# Patient Record
Sex: MALE | Race: WHITE | NOT HISPANIC OR LATINO | Employment: FULL TIME | URBAN - METROPOLITAN AREA
[De-identification: names, ages, dates, MRNs, and addresses within clinical notes are randomized per-mention and may not be internally consistent; named-entity substitution may affect disease eponyms.]

---

## 2020-09-17 ENCOUNTER — OFFICE VISIT (OUTPATIENT)
Dept: URGENT CARE | Facility: CLINIC | Age: 25
End: 2020-09-17
Payer: OTHER MISCELLANEOUS

## 2020-09-17 VITALS
WEIGHT: 195 LBS | TEMPERATURE: 98 F | RESPIRATION RATE: 16 BRPM | OXYGEN SATURATION: 98 % | DIASTOLIC BLOOD PRESSURE: 77 MMHG | BODY MASS INDEX: 26.41 KG/M2 | SYSTOLIC BLOOD PRESSURE: 134 MMHG | HEART RATE: 72 BPM | HEIGHT: 72 IN

## 2020-09-17 DIAGNOSIS — M54.9 DORSALGIA, UNSPECIFIED: ICD-10-CM

## 2020-09-17 DIAGNOSIS — M54.42 ACUTE LEFT-SIDED LOW BACK PAIN WITH LEFT-SIDED SCIATICA: Primary | ICD-10-CM

## 2020-09-17 DIAGNOSIS — M43.17 SPONDYLOLISTHESIS AT L5-S1 LEVEL: ICD-10-CM

## 2020-09-17 PROCEDURE — 99203 OFFICE O/P NEW LOW 30 MIN: CPT | Mod: S$GLB,,, | Performed by: PHYSICIAN ASSISTANT

## 2020-09-17 PROCEDURE — 72100 XR LUMBAR SPINE 2 OR 3 VIEWS: ICD-10-PCS | Mod: FY,S$GLB,, | Performed by: RADIOLOGY

## 2020-09-17 PROCEDURE — 72100 X-RAY EXAM L-S SPINE 2/3 VWS: CPT | Mod: FY,S$GLB,, | Performed by: RADIOLOGY

## 2020-09-17 PROCEDURE — 99203 PR OFFICE/OUTPT VISIT, NEW, LEVL III, 30-44 MIN: ICD-10-PCS | Mod: S$GLB,,, | Performed by: PHYSICIAN ASSISTANT

## 2020-09-17 RX ORDER — METHYLPREDNISOLONE 4 MG/1
TABLET ORAL
Qty: 1 PACKAGE | Refills: 0 | Status: SHIPPED | OUTPATIENT
Start: 2020-09-17 | End: 2020-09-24

## 2020-09-17 RX ORDER — NAPROXEN 500 MG/1
500 TABLET ORAL 2 TIMES DAILY WITH MEALS
Qty: 30 TABLET | Refills: 0 | Status: SHIPPED | OUTPATIENT
Start: 2020-09-17 | End: 2020-10-28 | Stop reason: ALTCHOICE

## 2020-09-17 NOTE — PROGRESS NOTES
Subjective:       Patient ID: Aston Aviles is a 24 y.o. male.    Chief Complaint: Back Pain    New WC Back Pain ( DOI 09- ) While working at a job site, he picked up a bin of crowbars and immediately felt pain, which progressively got worse during the day. Pain score 2/10 with complaints of Constant Aching pain that radiates down his LT Leg to his LT Knee, Stiffness, Pain w/walking. Taking OTC IBP for pain. SH    Back Pain  This is a new problem. The current episode started 1 to 4 weeks ago. The problem occurs constantly. The problem has been gradually improving since onset. The pain is present in the lumbar spine. The quality of the pain is described as aching. The pain radiates to the left knee and left thigh. The pain is at a severity of 2/10. The pain is moderate. The pain is the same all the time. The symptoms are aggravated by bending, lying down, sitting, standing and twisting. Stiffness is present all day. Pertinent negatives include no abdominal pain, bladder incontinence, bowel incontinence, chest pain, dysuria, fever, numbness or tingling. The treatment provided mild relief.       Constitution: Negative for chills, fatigue and fever.   HENT: Negative for facial trauma.    Neck: Negative for neck pain and neck stiffness.   Cardiovascular: Negative for chest trauma and chest pain.   Eyes: Negative for eye trauma and eye pain.   Respiratory: Negative for shortness of breath.    Gastrointestinal: Negative for abdominal trauma, abdominal pain and bowel incontinence.   Genitourinary: Negative for dysuria, urgency, bladder incontinence, hematuria and history of kidney stones.   Musculoskeletal: Positive for pain, back pain and muscle ache. Negative for joint pain, abnormal ROM of joint, muscle cramps and history of spine disorder.   Skin: Negative for rash and wound.   Neurological: Negative for coordination disturbances, numbness and tingling.        Objective:      Physical Exam  Vitals signs and  nursing note reviewed.   Constitutional:       General: He is not in acute distress.     Appearance: Normal appearance. He is well-developed.   HENT:      Head: Normocephalic and atraumatic.      Right Ear: Hearing and external ear normal.      Left Ear: Hearing and external ear normal.      Nose: Nose normal. No nasal deformity.   Eyes:      General: Lids are normal.      Conjunctiva/sclera: Conjunctivae normal.      Right eye: Right conjunctiva is not injected.      Left eye: Left conjunctiva is not injected.   Neck:      Musculoskeletal: Normal range of motion. No spinous process tenderness or muscular tenderness.      Trachea: Trachea normal.   Cardiovascular:      Pulses: Normal pulses.           Dorsalis pedis pulses are 2+ on the right side and 2+ on the left side.        Posterior tibial pulses are 2+ on the right side and 2+ on the left side.   Pulmonary:      Effort: Pulmonary effort is normal. No respiratory distress.      Breath sounds: No stridor.   Musculoskeletal:      Cervical back: Normal.      Thoracic back: Normal.      Lumbar back: He exhibits tenderness. He exhibits normal range of motion, no deformity and normal pulse.        Back:    Skin:     General: Skin is warm and dry.      Findings: No abrasion or bruising.   Neurological:      Mental Status: He is alert.      GCS: GCS eye subscore is 4. GCS verbal subscore is 5. GCS motor subscore is 6.      Sensory: No sensory deficit.      Deep Tendon Reflexes: Reflexes are normal and symmetric.      Reflex Scores:       Patellar reflexes are 2+ on the right side and 2+ on the left side.       Achilles reflexes are 2+ on the right side and 2+ on the left side.     Comments: SLR positive LLE at 40 degrees  Negative RLE    Psychiatric:         Attention and Perception: He is attentive.         Speech: Speech normal.         Behavior: Behavior normal.         Thought Content: Thought content normal.           X-ray Lumbar Spine 2 Or 3 Views    Result  Date: 9/17/2020  EXAMINATION: XR LUMBAR SPINE 2 OR 3 VIEWS CLINICAL HISTORY: Back pain or radiculopathy, < 6 wks, uncomplicated;WORKERS COMP;  Lumbago with sciatica, left side TECHNIQUE: PA, lateral and lateral lumbosacral view COMPARISON: None FINDINGS: There is 5 mm of anterolisthesis of L5 on S1 with similar 5 mm of retrolisthesis of L4 and L5.  There is no fracture or pedicle destruction.  The SI joints and lumbosacral junction appear maintained.     Spondylosis at L4-5 and L5-S1 with 5 mm of retrolisthesis of L4 on L5 and 5 mm of anterolisthesis of L5 on S1. No evidence of fracture, pedicle destruction or bony destructive process. Electronically signed by: Khanh Maxwell Date:    09/17/2020 Time:    17:13    Assessment:       1. Acute left-sided low back pain with left-sided sciatica    2. Dorsalgia, unspecified    3. Spondylolisthesis at L5-S1 level        Plan:         Medications Ordered This Encounter   Medications    methylPREDNISolone (MEDROL DOSEPACK) 4 mg tablet     Sig: use as directed     Dispense:  1 Package     Refill:  0    naproxen (NAPROSYN) 500 MG tablet     Sig: Take 1 tablet (500 mg total) by mouth 2 (two) times daily with meals.     Dispense:  30 tablet     Refill:  0     Patient Instructions: Daily home exercises/warm soaks   Restrictions: No lifting/pushing/pulling more than 25 lbs  Follow up in about 1 week (around 9/24/2020).        Patient Instructions       Sciatica    Sciatica is a condition that causes pain in the lower back that spreads down into the buttock, hip, and leg. Sometimes the leg pain can happen without any back pain. Sciatica happens when a spinal nerve is irritated or has pressure put on it as comes out of the spinal canal in the lower back. This most often happens when a bulge or rupture of a nearby spinal disk presses on the nerve. Sciatica can also be caused by a narrowing of the spinal canal (spinal stenosis) or spasm of the muscle in the buttocks that the sciatic  nerve passes through (pyriform muscle). Sciatica is also called lumbar radiculopathy.  Sciatica may begin after a sudden twisting or bending force, such as in a car accident. Or it can happen after a simple awkward movement. In either case, muscle spasm often also happens. Muscle spasm makes the pain worse.  A healthcare provider makes a diagnosis of sciatica from your symptoms and a physical exam. Unless you had an injury from a car accident or fall, you usually wont have X-rays taken at this time. This is because the nerves and disks in your back cant be seen on an X-ray. If the provider sees signs of a compressed nerve, you will need to schedule an MRI scan as an outpatient. Signs of a compressed nerve include loss of strength in a leg.  Most sciatica gets better with medicine, exercise, and physical therapy. If your symptoms continue after at least 3 months of medical treatment, you may need surgery or injections to your lower back.  Home care  Follow these tips when caring for yourself at home:  · You may need to stay in bed the first few days. But as soon as possible, begin sitting up or walking. This will help you avoid problems that come from staying in bed for long periods.  · When in bed, try to find a position that is comfortable. A firm mattress is best. Try lying flat on your back with pillows under your knees. You can also try lying on your side with your knees bent up toward your chest and a pillow between your knees.  · Avoid sitting for long periods. This puts more stress on your lower back than standing or walking.  · Use heat from a hot shower, hot bath, or heating pad to help ease pain. Massage can also help. You can also try using an ice pack. You can make your own ice pack by putting ice cubes in a plastic bag. Wrap the bag in a thin towel. Try both heat and cold to see which works best. Use the method that feels best for 20 minutes several times a day.  · You may use acetaminophen or  ibuprofen to ease pain, unless another pain medicine was prescribed. Note: If you have chronic liver or kidney disease, talk with your healthcare provider before taking these medicines. Also talk with your provider if youve had a stomach ulcer or gastrointestinal bleeding.  · Use safe lifting methods. Dont lift anything heavier than 15 pounds until all of the pain is gone.  Follow-up care  Follow up with your healthcare provider, or as advised. You may need physical therapy or additional tests.  If X-rays were taken, a radiologist will look at them. You will be told of any new findings that may affect your care.  When to seek medical advice  Call your healthcare provider right away if any of these occur:  · Pain gets worse even after taking prescribed medicine  · Weakness or numbness in 1 or both legs or hips  · Numbness in your groin or genital area  · You cant control your bowel or bladder  · Fever  · Redness or swelling over your back or spine   Date Last Reviewed: 8/1/2016  © 5421-2508 [employer]. [address] [place], [place] [address]. All rights reserved. This information is not intended as a substitute for professional medical care. Always follow your healthcare professional's instructions.        Back Exercises: Abdominal Lift Brace with Marching    The abdominal lift brace with march strengthens your lower abdominal muscles, helping you keep your pelvis and back stable:  · Lie on the floor with both knees bent. Put your feet flat on the floor and your arms by your sides. Tighten your abdominal muscles. Be sure to continue to breathe.  · Lift one bent knee about 2 inches then return it to the floor and lift the other about 2 inches. Keep your abdominal muscles tight and continue to breathe. These motions should be slow and controlled without your pelvis rocking side to side.  · Repeat 10 times.  Date Last Reviewed: 8/16/2015  © 4737-7793 [employer]. [address] [place] [address],  [place] [address]. All rights reserved. This information is not intended as a substitute for professional medical care. Always follow your healthcare professional's instructions.        Back Exercises: Back Press    Do this exercise on your hands and knees. Keep your knees under your hips and your hands under your shoulders. Keep your spine in a neutral position (not arched or sagging). Be sure to maintain your necks natural curve:  · Tighten your stomach and buttock muscles to press your back upward. Let your head drop slightly.  · Hold for 5 seconds. Return to starting position.  · Repeat 5 times.  Date Last Reviewed: 10/11/2015  © 6887-5559 [employer]. [address] [place] [address]. All rights reserved. This information is not intended as a substitute for professional medical care. Always follow your healthcare professional's instructions.        Back Exercises: Leg Pull    To start, lie on your back with your knees bent and feet flat on the floor. Dont press your neck or lower back to the floor. Breathe deeply. You should feel comfortable and relaxed in this position.  · Pull one knee to your chest.  · Hold for 30 to 60 seconds. Return to starting position.  · Repeat 2 times.  · Switch legs.  · For a double leg pull, pull both legs to your chest at the same time. Repeat 2 times.  For your safety, check with your healthcare provider before starting an exercise program.   Date Last Reviewed: 8/16/2015  © 5360-0789 [employer]. [address] [place] [address]. All rights reserved. This information is not intended as a substitute for professional medical care. Always follow your healthcare professional's instructions.

## 2020-09-17 NOTE — LETTER
[facility]  [address], [address]  [place] [address]  Phone: [phone]  Fax: [phone]  [facility]: [email or web address]    Pt Name: Aston Saavedra Date: 09/01/2020   Employee ID: [id number] Date of First Treatment: 09/17/2020   Company: [employer]      Appointment Time:  Arrived: 3:32 PM   Provider: Louis Mar PA-C Time Out: 5:30 PM     Office Treatment:   1. Acute left-sided low back pain with left-sided sciatica    2. Dorsalgia, unspecified    3. Spondylolisthesis at L5-S1 level      Medications Ordered This Encounter   Medications    methylPREDNISolone (MEDROL DOSEPACK) 4 mg tablet    naproxen (NAPROSYN) 500 MG tablet      Patient Instructions: Daily home exercises/warm soaks    Restrictions: No lifting/pushing/pulling more than 25 lbs     Return Appointment:   9/24/2020 at 10:30 AM       SH

## 2020-09-17 NOTE — PATIENT INSTRUCTIONS
Sciatica    Sciatica is a condition that causes pain in the lower back that spreads down into the buttock, hip, and leg. Sometimes the leg pain can happen without any back pain. Sciatica happens when a spinal nerve is irritated or has pressure put on it as comes out of the spinal canal in the lower back. This most often happens when a bulge or rupture of a nearby spinal disk presses on the nerve. Sciatica can also be caused by a narrowing of the spinal canal (spinal stenosis) or spasm of the muscle in the buttocks that the sciatic nerve passes through (pyriform muscle). Sciatica is also called lumbar radiculopathy.  Sciatica may begin after a sudden twisting or bending force, such as in a car accident. Or it can happen after a simple awkward movement. In either case, muscle spasm often also happens. Muscle spasm makes the pain worse.  A healthcare provider makes a diagnosis of sciatica from your symptoms and a physical exam. Unless you had an injury from a car accident or fall, you usually wont have X-rays taken at this time. This is because the nerves and disks in your back cant be seen on an X-ray. If the provider sees signs of a compressed nerve, you will need to schedule an MRI scan as an outpatient. Signs of a compressed nerve include loss of strength in a leg.  Most sciatica gets better with medicine, exercise, and physical therapy. If your symptoms continue after at least 3 months of medical treatment, you may need surgery or injections to your lower back.  Home care  Follow these tips when caring for yourself at home:  · You may need to stay in bed the first few days. But as soon as possible, begin sitting up or walking. This will help you avoid problems that come from staying in bed for long periods.  · When in bed, try to find a position that is comfortable. A firm mattress is best. Try lying flat on your back with pillows under your knees. You can also try lying on your side with your knees bent up  toward your chest and a pillow between your knees.  · Avoid sitting for long periods. This puts more stress on your lower back than standing or walking.  · Use heat from a hot shower, hot bath, or heating pad to help ease pain. Massage can also help. You can also try using an ice pack. You can make your own ice pack by putting ice cubes in a plastic bag. Wrap the bag in a thin towel. Try both heat and cold to see which works best. Use the method that feels best for 20 minutes several times a day.  · You may use acetaminophen or ibuprofen to ease pain, unless another pain medicine was prescribed. Note: If you have chronic liver or kidney disease, talk with your healthcare provider before taking these medicines. Also talk with your provider if youve had a stomach ulcer or gastrointestinal bleeding.  · Use safe lifting methods. Dont lift anything heavier than 15 pounds until all of the pain is gone.  Follow-up care  Follow up with your healthcare provider, or as advised. You may need physical therapy or additional tests.  If X-rays were taken, a radiologist will look at them. You will be told of any new findings that may affect your care.  When to seek medical advice  Call your healthcare provider right away if any of these occur:  · Pain gets worse even after taking prescribed medicine  · Weakness or numbness in 1 or both legs or hips  · Numbness in your groin or genital area  · You cant control your bowel or bladder  · Fever  · Redness or swelling over your back or spine   Date Last Reviewed: 8/1/2016  © 2715-9640 [employer]. [address] [place] [address], [place] [address]. All rights reserved. This information is not intended as a substitute for professional medical care. Always follow your healthcare professional's instructions.        Back Exercises: Abdominal Lift Brace with Marching    The abdominal lift brace with march strengthens your lower abdominal muscles, helping you keep your pelvis and back  stable:  · Lie on the floor with both knees bent. Put your feet flat on the floor and your arms by your sides. Tighten your abdominal muscles. Be sure to continue to breathe.  · Lift one bent knee about 2 inches then return it to the floor and lift the other about 2 inches. Keep your abdominal muscles tight and continue to breathe. These motions should be slow and controlled without your pelvis rocking side to side.  · Repeat 10 times.  Date Last Reviewed: 8/16/2015  © 6532-6125 [employer]. [address] [place] [address]. All rights reserved. This information is not intended as a substitute for professional medical care. Always follow your healthcare professional's instructions.        Back Exercises: Back Press    Do this exercise on your hands and knees. Keep your knees under your hips and your hands under your shoulders. Keep your spine in a neutral position (not arched or sagging). Be sure to maintain your necks natural curve:  · Tighten your stomach and buttock muscles to press your back upward. Let your head drop slightly.  · Hold for 5 seconds. Return to starting position.  · Repeat 5 times.  Date Last Reviewed: 10/11/2015  © 7003-6923 [employer]. [address] [place] [address]. All rights reserved. This information is not intended as a substitute for professional medical care. Always follow your healthcare professional's instructions.        Back Exercises: Leg Pull    To start, lie on your back with your knees bent and feet flat on the floor. Dont press your neck or lower back to the floor. Breathe deeply. You should feel comfortable and relaxed in this position.  · Pull one knee to your chest.  · Hold for 30 to 60 seconds. Return to starting position.  · Repeat 2 times.  · Switch legs.  · For a double leg pull, pull both legs to your chest at the same time. Repeat 2 times.  For your safety, check with your healthcare provider before starting an exercise  program.   Date Last Reviewed: 8/16/2015  © 8726-5837 [employer]. [address] [place] [address], [place] [address]. All rights reserved. This information is not intended as a substitute for professional medical care. Always follow your healthcare professional's instructions.

## 2020-09-24 ENCOUNTER — OFFICE VISIT (OUTPATIENT)
Dept: URGENT CARE | Facility: CLINIC | Age: 25
End: 2020-09-24
Payer: OTHER MISCELLANEOUS

## 2020-09-24 DIAGNOSIS — M54.42 ACUTE LEFT-SIDED LOW BACK PAIN WITH LEFT-SIDED SCIATICA: Primary | ICD-10-CM

## 2020-09-24 DIAGNOSIS — M43.17 SPONDYLOLISTHESIS AT L5-S1 LEVEL: ICD-10-CM
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
## 2020-09-24 PROCEDURE — 99214 PR OFFICE/OUTPT VISIT, EST, LEVL IV, 30-39 MIN: ICD-10-PCS | Mod: S$GLB,,, | Performed by: PHYSICIAN ASSISTANT

## 2020-09-24 PROCEDURE — 99214 OFFICE O/P EST MOD 30 MIN: CPT | Mod: S$GLB,,, | Performed by: PHYSICIAN ASSISTANT

## 2020-09-24 NOTE — LETTER
[facility]  [address], [address]  [place] [facility] [address]  Phone: [phone]  Fax: [phone]  [facility]: [email or web address]    Pt Name: Aston Saavedra Date: 09/01/2020   Employee ID: [id number] Date of  Treatment: 09/24/2020   Company: [employer]      Appointment Time: 03:15 PM Arrived: 3:28 PM   Provider: Louis Mar PA-C Time Out: 4:20 PM     Office Treatment:     1. Acute left-sided low back pain with left-sided sciatica    2. Spondylolisthesis at L5-S1 level    3. Work related injury          Patient Instructions: Daily home exercises/warm soaks(Continue Naproxen 500mg twice daily.)    Restrictions: No lifting/pushing/pulling more than 25 lbs     Return Appointment: 09/30/2020 @ 2:00 PM  IJ

## 2020-09-24 NOTE — PROGRESS NOTES
Subjective:       Patient ID: Aston Aviles is a 24 y.o. male.    Chief Complaint: Back Pain    Patient presents in clinic for W/C follow up on Back Pain (DOI 09- ).  He states his pain has improved, and rates the pain at a level 1.  Takes Naproxen PRN.  Finished the steroids.    Back Pain  This is a recurrent problem. The current episode started 1 to 4 weeks ago. The problem occurs intermittently. The problem has been rapidly improving since onset. The pain is present in the lumbar spine. The pain does not radiate. The pain is at a severity of 1/10. The pain is mild. The symptoms are aggravated by bending. Associated symptoms include tingling. Pertinent negatives include no abdominal pain, bladder incontinence, bowel incontinence, chest pain, dysuria, fever, headaches, leg pain, numbness, paresis, paresthesias, pelvic pain, perianal numbness, weakness or weight loss. Risk factors include recent trauma. He has tried NSAIDs for the symptoms. The treatment provided moderate relief.       Constitution: Negative for fatigue and fever.   Cardiovascular: Negative for chest pain.   Gastrointestinal: Negative for abdominal pain and bowel incontinence.   Genitourinary: Negative for dysuria, urgency, bladder incontinence, hematuria and pelvic pain.   Musculoskeletal: Positive for back pain. Negative for muscle cramps and history of spine disorder.   Skin: Negative for rash.   Neurological: Negative for coordination disturbances, headaches, numbness and tingling.        Objective:      Physical Exam  Nursing note reviewed.   Constitutional:       General: He is not in acute distress.     Appearance: Normal appearance. He is well-developed.   HENT:      Head: Normocephalic and atraumatic.      Right Ear: Hearing and external ear normal.      Left Ear: Hearing and external ear normal.      Nose: Nose normal. No nasal deformity.   Eyes:      General: Lids are normal.      Conjunctiva/sclera: Conjunctivae normal.      Right  eye: Right conjunctiva is not injected.      Left eye: Left conjunctiva is not injected.   Neck:      Musculoskeletal: Normal range of motion. No spinous process tenderness or muscular tenderness.      Trachea: Trachea normal.   Cardiovascular:      Pulses: Normal pulses.           Dorsalis pedis pulses are 2+ on the right side and 2+ on the left side.        Posterior tibial pulses are 2+ on the right side and 2+ on the left side.   Pulmonary:      Effort: Pulmonary effort is normal. No respiratory distress.      Breath sounds: No stridor.   Musculoskeletal:      Cervical back: Normal.      Thoracic back: Normal.      Lumbar back: He exhibits normal range of motion, no tenderness, no deformity and normal pulse.   Skin:     General: Skin is warm and dry.      Findings: No abrasion or bruising.   Neurological:      Mental Status: He is alert.      GCS: GCS eye subscore is 4. GCS verbal subscore is 5. GCS motor subscore is 6.      Sensory: No sensory deficit.      Deep Tendon Reflexes:      Reflex Scores:       Patellar reflexes are 1+ on the right side and 1+ on the left side.       Achilles reflexes are 2+ on the right side and 2+ on the left side.     Comments: SLR negative bilaterally   Psychiatric:         Attention and Perception: He is attentive.         Speech: Speech normal.         Behavior: Behavior normal.         Thought Content: Thought content normal.         Assessment:       1. Acute left-sided low back pain with left-sided sciatica    2. Spondylolisthesis at L5-S1 level    3. Work related injury        Plan:       Patient is doing much better with minimal pain however L-spine x-ray is very abnormal.  I will have patient see Dr. Pena for evaluation and work status.         Patient Instructions: Daily home exercises/warm soaks(Continue Naproxen 500mg twice daily.)   Restrictions: No lifting/pushing/pulling more than 25 lbs  Follow up in about 6 days (around 9/30/2020) for Dr. Pena.

## 2020-09-30 ENCOUNTER — OFFICE VISIT (OUTPATIENT)
Dept: URGENT CARE | Facility: CLINIC | Age: 25
End: 2020-09-30
Payer: OTHER MISCELLANEOUS

## 2020-09-30 DIAGNOSIS — M54.42 ACUTE LEFT-SIDED LOW BACK PAIN WITH LEFT-SIDED SCIATICA: Primary | ICD-10-CM

## 2020-09-30 DIAGNOSIS — M43.17 SPONDYLOLISTHESIS AT L5-S1 LEVEL: ICD-10-CM

## 2020-09-30 PROCEDURE — 99214 PR OFFICE/OUTPT VISIT, EST, LEVL IV, 30-39 MIN: ICD-10-PCS | Mod: S$GLB,,, | Performed by: PREVENTIVE MEDICINE

## 2020-09-30 PROCEDURE — 99214 OFFICE O/P EST MOD 30 MIN: CPT | Mod: S$GLB,,, | Performed by: PREVENTIVE MEDICINE

## 2020-09-30 NOTE — LETTER
[facility]  [address], [address]  [place] [facility] [place] [address]  Phone: [phone]  Fax: [phone]  [facility]: [id number]    Pt Name: Aston Saavedra Date: 09/01/2020   Employee ID: [id number] Date of Treatment: 09/30/2020   Company: [employer]      Appointment Time: 02:00 PM Arrived: 1:35 PM   Provider: Remy Pena MD Time Out: 2:10 PM     Office Treatment:   1. Acute left-sided low back pain with left-sided sciatica    2. Spondylolisthesis at L5-S1 level          Patient Instructions: Daily home exercises/warm soaks(Encouraged to do more stretching exercises rather than using weights while exercising.)    Restrictions: Regular Duty, Discharged from Occupational Health     SH

## 2020-09-30 NOTE — PROGRESS NOTES
Subjective:       Patient ID: Aston Aviles is a 24 y.o. male.    Chief Complaint: No chief complaint on file.     Follow-up of Back Pain ( DOI 09- ) Pain score 1/10 with complaint of Intermittent Aching pain of LT Buttock. Taking Naproxen 500mg and doing Daily home exercises w/wm soaks. SH      Constitution: Negative for fatigue.   Gastrointestinal: Negative for abdominal pain and bowel incontinence.   Genitourinary: Negative for dysuria, urgency, bladder incontinence and hematuria.   Musculoskeletal: Positive for joint pain. Negative for back pain, muscle cramps and history of spine disorder.   Skin: Negative for rash.   Neurological: Negative for coordination disturbances, numbness and tingling.        Objective:      Physical Exam  Vitals signs and nursing note reviewed.   Constitutional:       Appearance: He is well-developed.   HENT:      Head: Normocephalic.   Eyes:      Pupils: Pupils are equal, round, and reactive to light.   Neck:      Musculoskeletal: Normal range of motion.   Cardiovascular:      Rate and Rhythm: Normal rate.   Pulmonary:      Effort: Pulmonary effort is normal.   Musculoskeletal:      Lumbar back: He exhibits decreased range of motion and pain. He exhibits no tenderness, no bony tenderness, no swelling, no edema, no deformity, no laceration, no spasm and normal pulse.        Back:       Comments: Patient now has much less pain about his back with both palpation range of motion testing.  He has minimal pain with forward flexion to approximately 90°, extension to 10°, and lateral bending to 25°.  He has no evidence of swelling or spasm about his lower back with palpation and range of motion testing.  He has no motor or sensory deficits about his lower extremities.  Distal pulses are equal intact.   Skin:     General: Skin is warm and dry.   Neurological:      Mental Status: He is alert and oriented to person, place, and time.         Assessment:       1. Acute left-sided low back  pain with left-sided sciatica    2. Spondylolisthesis at L5-S1 level        Plan:     discussed with patient the findings on x-ray which revealed spondylolisthesis of L5 on S1.  Patient is aware that this is a condition that like reoccurred while developing in his teenage years during which time he was actively participating in sports and weightlifting.  He understands that this might cause some increased morbidity associated with back pain or injury.  He will therefore attempt to reduce his risk by doing less physically demanding work in the future.       Patient Instructions: Daily home exercises/warm soaks(Encouraged to do more stretching exercises rather than using weights while exercising.)   Restrictions: Regular Duty, Discharged from Occupational Health  Follow up if symptoms worsen or fail to improve.

## 2020-10-14 ENCOUNTER — OFFICE VISIT (OUTPATIENT)
Dept: URGENT CARE | Facility: CLINIC | Age: 25
End: 2020-10-14
Payer: OTHER MISCELLANEOUS

## 2020-10-14 DIAGNOSIS — M43.16 SPONDYLOLISTHESIS OF LUMBAR REGION: ICD-10-CM

## 2020-10-14 DIAGNOSIS — M54.42 ACUTE LEFT-SIDED LOW BACK PAIN WITH LEFT-SIDED SCIATICA: Primary | ICD-10-CM

## 2020-10-14 PROCEDURE — 99214 PR OFFICE/OUTPT VISIT, EST, LEVL IV, 30-39 MIN: ICD-10-PCS | Mod: S$GLB,,, | Performed by: EMERGENCY MEDICINE

## 2020-10-14 PROCEDURE — 99214 OFFICE O/P EST MOD 30 MIN: CPT | Mod: S$GLB,,, | Performed by: EMERGENCY MEDICINE

## 2020-10-14 NOTE — PROGRESS NOTES
Subjective:       Patient ID: Aston Aviles is a 24 y.o. male.    Chief Complaint: Back Pain    Pt is here for a follow up WC visit for left lower back DOI:9/1/20 rates pain 8/10 with movement describes the pain as shooting sharp pain went to a urgent care 10/13/20 because of the pain he was experecing and was given Flexeril and Naproxen 500mg..sb    Patient is a 24-year-old male who in early September sustained an injury at work while lifting with low back pain left worse than right.  He was seen here 3 times and placed on anti-inflammatory muscle relaxer and took it easy at working was doing better, however yesterday after bending forward he had a recurrence of the pain in the exact same region of the low back on the left.  He also had associated symptoms of pain with walking to the left buttock and leg and back.  Reports the pain as sharp and shooting pain down his back to his left lower extremity.  No new trauma no new injury.  He is leaving town tomorrow for 10 days and will be back on the 25th of October.    Back Pain  This is a recurrent problem. The current episode started more than 1 month ago. The problem occurs constantly. The problem is unchanged. The pain is present in the lumbar spine. The quality of the pain is described as shooting and stabbing. The pain does not radiate. The pain is at a severity of 8/10. The pain is moderate. The pain is the same all the time. The symptoms are aggravated by bending, position, standing, stress and twisting. Stiffness is present all day. Associated symptoms include weakness. Pertinent negatives include no abdominal pain, bladder incontinence, bowel incontinence, chest pain, dysuria, fever, headaches, leg pain, numbness, paresis, paresthesias, pelvic pain, perianal numbness, tingling or weight loss. He has tried muscle relaxant and NSAIDs for the symptoms. The treatment provided no relief.       Constitution: Negative for fatigue and fever.   Cardiovascular: Negative  for chest pain.   Gastrointestinal: Negative for abdominal pain and bowel incontinence.   Genitourinary: Negative for dysuria, urgency, bladder incontinence, hematuria and pelvic pain.   Musculoskeletal: Positive for back pain. Negative for muscle cramps and history of spine disorder.   Skin: Negative for rash.   Neurological: Negative for coordination disturbances, headaches, numbness and tingling.        Objective:      Physical Exam  Vitals signs and nursing note reviewed.   Constitutional:       Appearance: He is well-developed.   HENT:      Head: Normocephalic.   Eyes:      Pupils: Pupils are equal, round, and reactive to light.   Neck:      Musculoskeletal: Normal range of motion.   Cardiovascular:      Rate and Rhythm: Normal rate.   Pulmonary:      Effort: Pulmonary effort is normal.   Musculoskeletal:         General: Tenderness present.      Lumbar back: He exhibits decreased range of motion and pain. He exhibits no tenderness, no bony tenderness, no swelling, no edema, no deformity, no laceration, no spasm and normal pulse.        Back:       Right lower leg: No edema.      Left lower leg: No edema.      Comments: Tender to palpation as well as bending to 45° of the left low back, pain on lying down and pain elicited with 10° extension and lateral bending to 25°.  He has no evidence of swelling or spasm about his lower back with palpation and range of motion testing.  He has no motor or sensory deficits about his lower extremities.  Distal pulses are equal intact.   Skin:     General: Skin is warm and dry.   Neurological:      Mental Status: He is alert and oriented to person, place, and time.         X-ray Lumbar Spine 2 Or 3 Views    Result Date: 9/17/2020  EXAMINATION: XR LUMBAR SPINE 2 OR 3 VIEWS CLINICAL HISTORY: Back pain or radiculopathy, < 6 wks, uncomplicated;WORKERS COMP;  Lumbago with sciatica, left side TECHNIQUE: PA, lateral and lateral lumbosacral view COMPARISON: None FINDINGS: There is 5  mm of anterolisthesis of L5 on S1 with similar 5 mm of retrolisthesis of L4 and L5.  There is no fracture or pedicle destruction.  The SI joints and lumbosacral junction appear maintained.     Spondylosis at L4-5 and L5-S1 with 5 mm of retrolisthesis of L4 on L5 and 5 mm of anterolisthesis of L5 on S1. No evidence of fracture, pedicle destruction or bony destructive process. Electronically signed by: Khanh Maxwell Date:    09/17/2020 Time:    17:13      Assessment:       1. Acute left-sided low back pain with left-sided sciatica    2. Spondylolisthesis of lumbar region        Plan:       Patient is currently having significant pain to the left low back with some radicular symptoms/sciatica.  He currently has an anti-inflammatory as well as muscle relaxant.  He is off of work for 10 days secondary to vacation where he is traveling to [place].  With the retrolisthesis of L4 and L5 and the anterolisthesis of L5 on S1 with neurologic symptoms and persistent pain over 6 weeks after initial injury, will perform MRI to rule out herniated disc or nerve impingement or other acute radiculopathy from the low back.  We will see him back in clinic in 2 weeks when he returns with the intention of MRI being performed prior to revisit.     Patient Instructions: Attention not to aggravate affected area, Daily home exercises/warm soaks, MRI to be scheduled once authorized   Restrictions: No lifting/pushing/pulling more than 25 lbs, Avoid frequent bending/lifting/twisting, Sit or stand as needed, Avoid climbing/kneeling/squatting, Home today  Follow up in about 2 weeks (around 10/28/2020) for after MRI and after he returns from vacation.

## 2020-10-14 NOTE — LETTER
[facility] [place] [facility]  [address], [address]  [place] [address]  Phone: [phone]  Fax: [phone]  [facility]: [id number]    Pt Name: Asotn Saavedra Date: 09/01/2020   Employee ID: [id number] Date of Treatment: 10/14/2020   Company:  [employer]      Appointment Time:  Arrived: 12:30 PM   Provider: Homer Calabrese MD Time Out: 2:08 PM     Office Treatment:   1. Acute left-sided low back pain with left-sided sciatica    2. Spondylolisthesis of lumbar region          Patient Instructions: Attention not to aggravate affected area, Daily home exercises/warm soaks, MRI to be scheduled once authorized    Restrictions: No lifting/pushing/pulling more than 25 lbs, Avoid frequent bending/lifting/twisting, Sit or stand as needed, Avoid climbing/kneeling/squatting, Home today     Return Appointment: 10/28/2020 at 3:00 PM       AMELIA

## 2020-10-27 ENCOUNTER — HOSPITAL ENCOUNTER (OUTPATIENT)
Dept: RADIOLOGY | Facility: HOSPITAL | Age: 25
Discharge: HOME | End: 2020-10-27
Attending: EMERGENCY MEDICINE
Payer: OTHER MISCELLANEOUS

## 2020-10-27 DIAGNOSIS — M54.42 ACUTE LEFT-SIDED LOW BACK PAIN WITH LEFT-SIDED SCIATICA: ICD-10-CM

## 2020-10-27 DIAGNOSIS — M43.16 SPONDYLOLISTHESIS OF LUMBAR REGION: ICD-10-CM

## 2020-10-27 PROCEDURE — 72148 MRI LUMBAR SPINE W/O DYE: CPT | Mod: 26,,, | Performed by: RADIOLOGY

## 2020-10-27 PROCEDURE — 72148 MRI LUMBAR SPINE W/O DYE: CPT | Mod: TC

## 2020-10-27 PROCEDURE — 72148 MRI LUMBAR SPINE WITHOUT CONTRAST: ICD-10-PCS | Mod: 26,,, | Performed by: RADIOLOGY

## 2020-10-28 ENCOUNTER — OFFICE VISIT (OUTPATIENT)
Dept: URGENT CARE | Facility: CLINIC | Age: 25
End: 2020-10-28
Payer: OTHER MISCELLANEOUS

## 2020-10-28 DIAGNOSIS — M43.16 SPONDYLOLISTHESIS OF LUMBAR REGION: ICD-10-CM

## 2020-10-28 DIAGNOSIS — M51.26 HERNIATED LUMBAR INTERVERTEBRAL DISC: Primary | ICD-10-CM

## 2020-10-28 DIAGNOSIS — M54.42 ACUTE LEFT-SIDED LOW BACK PAIN WITH LEFT-SIDED SCIATICA: ICD-10-CM

## 2020-10-28 PROCEDURE — 99214 OFFICE O/P EST MOD 30 MIN: CPT | Mod: S$GLB,,, | Performed by: PREVENTIVE MEDICINE

## 2020-10-28 PROCEDURE — 99214 PR OFFICE/OUTPT VISIT, EST, LEVL IV, 30-39 MIN: ICD-10-PCS | Mod: S$GLB,,, | Performed by: PREVENTIVE MEDICINE

## 2020-10-28 RX ORDER — METHOCARBAMOL 500 MG/1
500 TABLET, FILM COATED ORAL NIGHTLY
Qty: 40 TABLET | Refills: 1 | Status: SHIPPED | OUTPATIENT
Start: 2020-10-28

## 2020-10-28 RX ORDER — NAPROXEN 500 MG/1
500 TABLET ORAL 2 TIMES DAILY WITH MEALS
Qty: 60 TABLET | Refills: 1 | Status: SHIPPED | OUTPATIENT
Start: 2020-10-28 | End: 2021-10-28

## 2020-10-28 NOTE — LETTER
[facility] [place]  [address], [address]  [place] [address]  Phone: [phone]  Fax: [phone]  [facility]: [email or web address]    Pt Name: Aston Saavedra Date: 09/01/2020   Employee ID: [id number] Date of Treatment: 10/28/2020   Company: [employer]      Appointment Time: 02:45 PM Arrived: 10:05 AM   Provider: Remy Pena MD Time Out: 11:05 AM     Office Treatment:     1. Herniated lumbar intervertebral disc    2. Acute left-sided low back pain with left-sided sciatica    3. Spondylolisthesis of lumbar region      Medications Ordered This Encounter   Medications    methocarbamoL (ROBAXIN) 500 MG Tab    naproxen (NAPROSYN) 500 MG tablet      Patient Instructions: Daily home exercises/warm soaks, Referral to specialist to be scheduled, once authorized(Referral to neuro surgery)    Restrictions: No lifting/pushing/pulling more than 25 lbs, Avoid frequent bending/lifting/twisting, Sit or stand as needed     Return Appointment: 11/18/2020 at 9:30 AM  MARIJA

## 2020-10-28 NOTE — PROGRESS NOTES
Subjective:       Patient ID: Aston Aviles is a 24 y.o. male.    Chief Complaint: Back Pain     Follow-up of Back Pain ( DOI 09- ) Pain score today is 1/10 with complaints of: With certain movements his Lower Back feels like its shifting/rolling which causes Stiffness, Tenderness/Soreness, Swelling. Not taking any Meds at this time. SH    Back Pain  Pertinent negatives include no abdominal pain, bladder incontinence, bowel incontinence, dysuria or numbness.       Constitution: Negative for fatigue.   Gastrointestinal: Negative for abdominal pain and bowel incontinence.   Genitourinary: Negative for dysuria, urgency, bladder incontinence and hematuria.   Musculoskeletal: Positive for joint pain, joint swelling and back pain. Negative for muscle cramps and history of spine disorder.   Skin: Negative for rash.   Neurological: Negative for coordination disturbances, numbness and tingling.        Objective:      Physical Exam  Vitals signs and nursing note reviewed.   Constitutional:       Appearance: He is well-developed.   HENT:      Head: Normocephalic.   Eyes:      Pupils: Pupils are equal, round, and reactive to light.   Neck:      Musculoskeletal: Normal range of motion.   Cardiovascular:      Rate and Rhythm: Normal rate.   Pulmonary:      Effort: Pulmonary effort is normal.   Musculoskeletal:      Lumbar back: He exhibits decreased range of motion and pain. He exhibits no tenderness, no bony tenderness, no swelling, no edema, no deformity, no laceration, no spasm and normal pulse.        Back:       Comments:  Patient has complaints of pain with both palpation and range of motion testing about his left low back radiating to his left buttock.  He has pain with forward flexion to approximately 90°, extension to 10°, and lateral bending to 25°.  He has no evidence of swelling or spasm about his lower back with palpation and range of motion testing.  He has no motor or sensory deficits about his lower  extremities.  Distal pulses are equal intact.   Skin:     General: Skin is warm and dry.   Neurological:      Mental Status: He is alert and oriented to person, place, and time.         Assessment:       1. Herniated lumbar intervertebral disc    2. Acute left-sided low back pain with left-sided sciatica    3. Spondylolisthesis of lumbar region        Plan:       Discussed at length the results of the MRI of the lumbosacral spine which revealed a herniated extrusion of disc material at the L5-S1 level with possible nerve root compression of the L5 nerve root on the left side.  Similar but to a lesser degree extrusion of disc was noted at the L4-L5 level.  Patient is aware that he is a possible surgical candidate and that he will be referred to neuro surgery.  He will continue his restrictions and medications previously prescribed  Medications Ordered This Encounter   Medications    methocarbamoL (ROBAXIN) 500 MG Tab     Sig: Take 1 tablet (500 mg total) by mouth nightly.     Dispense:  40 tablet     Refill:  1    naproxen (NAPROSYN) 500 MG tablet     Sig: Take 1 tablet (500 mg total) by mouth 2 (two) times daily with meals.     Dispense:  60 tablet     Refill:  1     Patient Instructions: Daily home exercises/warm soaks, Referral to specialist to be scheduled, once authorized(Referral to neuro surgery)   Restrictions: No lifting/pushing/pulling more than 25 lbs, Avoid frequent bending/lifting/twisting, Sit or stand as needed  Follow up in about 3 weeks (around 11/18/2020).

## 2020-11-04 ENCOUNTER — TELEPHONE (OUTPATIENT)
Dept: NEUROSURGERY | Facility: CLINIC | Age: 25
End: 2020-11-04

## 2020-11-04 NOTE — TELEPHONE ENCOUNTER
Spoke with pt and scheduled visit, imaging already done and will change to VV when Mychart set up complete

## 2020-11-04 NOTE — TELEPHONE ENCOUNTER
----- Message from Nahomi Vines MD sent at 11/4/2020 12:01 PM CST -----  Yes I would be happy to see him.  ----- Message -----  From: Ralph Overton LPN  Sent: 11/2/2020   4:23 PM CST  To: Nahomi Vines MD    I have another referral for Neurosurgery for this patient.  Are you able to see him as well?    Thanks,  Cody

## 2020-11-09 ENCOUNTER — OFFICE VISIT (OUTPATIENT)
Dept: NEUROSURGERY | Facility: CLINIC | Age: 25
End: 2020-11-09
Payer: OTHER MISCELLANEOUS

## 2020-11-09 DIAGNOSIS — M51.26 HERNIATION OF INTERVERTEBRAL DISC BETWEEN L4 AND L5: Primary | ICD-10-CM

## 2020-11-09 DIAGNOSIS — M51.36 DDD (DEGENERATIVE DISC DISEASE), LUMBAR: ICD-10-CM

## 2020-11-09 DIAGNOSIS — M48.061 LUMBAR FORAMINAL STENOSIS: ICD-10-CM

## 2020-11-09 DIAGNOSIS — M43.17 SPONDYLOLISTHESIS AT L5-S1 LEVEL: ICD-10-CM

## 2020-11-09 DIAGNOSIS — M54.40 BILATERAL LOW BACK PAIN WITH SCIATICA, SCIATICA LATERALITY UNSPECIFIED, UNSPECIFIED CHRONICITY: ICD-10-CM

## 2020-11-09 PROCEDURE — 99201 PR OFFICE/OUTPT VISIT,NEW,LEVL I: ICD-10-PCS | Mod: 95,,, | Performed by: STUDENT IN AN ORGANIZED HEALTH CARE EDUCATION/TRAINING PROGRAM

## 2020-11-09 PROCEDURE — 99201 PR OFFICE/OUTPT VISIT,NEW,LEVL I: CPT | Mod: 95,,, | Performed by: STUDENT IN AN ORGANIZED HEALTH CARE EDUCATION/TRAINING PROGRAM

## 2020-11-09 NOTE — PROGRESS NOTES
"  Digital Medicine: Video Consult    HPI   Aston Aviles is a 25 yo male referred by Dr. Pena for evaluation of lower back pain & MRI findings.  He reports episode of severe & debilitating back pain after lifting heavy objects at work in September 2020 with duration of approximately one week.  His symptoms recurred once since with similar severity and continues to have episodic "sciatic" leg pain that shoots down his posterior left leg and feels like something is shifting around when he moves in certain ways but otherwise he has had significant improvement in his back pain and no longer requires taking medication on a regular basis.  He has not had any physical therapy.    There is no problem list on file for this patient.      History reviewed. No pertinent past medical history.    Family History   Problem Relation Age of Onset    No Known Problems Mother     No Known Problems Father        Social History     Socioeconomic History    Marital status: Single     Spouse name: Not on file    Number of children: Not on file    Years of education: Not on file    Highest education level: Not on file   Occupational History    Not on file   Social Needs    Financial resource strain: Not on file    Food insecurity     Worry: Not on file     Inability: Not on file    Transportation needs     Medical: Not on file     Non-medical: Not on file   Tobacco Use    Smoking status: Never Smoker    Smokeless tobacco: Never Used   Substance and Sexual Activity    Alcohol use: Yes     Comment: 3 times a week    Drug use: Never    Sexual activity: Not on file   Lifestyle    Physical activity     Days per week: Not on file     Minutes per session: Not on file    Stress: Not on file   Relationships    Social connections     Talks on phone: Not on file     Gets together: Not on file     Attends [employer] service: Not on file     Active member of club or organization: Not on file     Attends meetings of clubs or " organizations: Not on file     Relationship status: Not on file   Other Topics Concern    Not on file   Social History Narrative    Not on file       Review of patient's allergies indicates:  No Known Allergies      Current Outpatient Medications:     methocarbamoL (ROBAXIN) 500 MG Tab, Take 1 tablet (500 mg total) by mouth nightly., Disp: 40 tablet, Rfl: 1    naproxen (NAPROSYN) 500 MG tablet, Take 1 tablet (500 mg total) by mouth 2 (two) times daily with meals., Disp: 60 tablet, Rfl: 1      MRI L spine personally reviewed- DDD with central disc herniation at L4-5, grade 1 spondylolisthesis with associated foraminal stenosis on the left at L5-S1, bilateral L5 pars defect    A/P; 25 yo male with back and left leg pain that has improved but not resolved and imaging findings as described above. He needs dynamic plain films to evaluate for instability and if none noted, he may also benefit from a trial of physical therapy.  Will plan to re-eval in 3 months.

## 2021-01-08 ENCOUNTER — CLINICAL SUPPORT (OUTPATIENT)
Dept: URGENT CARE | Facility: CLINIC | Age: 26
End: 2021-01-08
Payer: COMMERCIAL

## 2021-01-08 DIAGNOSIS — Z11.59 SCREENING FOR VIRAL DISEASE: Primary | ICD-10-CM

## 2021-01-08 LAB
CTP QC/QA: YES
SARS-COV-2 RDRP RESP QL NAA+PROBE: NEGATIVE

## 2021-01-08 PROCEDURE — U0002 COVID-19 LAB TEST NON-CDC: HCPCS | Mod: QW,S$GLB,, | Performed by: PHYSICIAN ASSISTANT

## 2021-01-08 PROCEDURE — U0002: ICD-10-PCS | Mod: QW,S$GLB,, | Performed by: PHYSICIAN ASSISTANT

## 2021-07-29 ENCOUNTER — CLINICAL SUPPORT (OUTPATIENT)
Dept: URGENT CARE | Facility: CLINIC | Age: 26
End: 2021-07-29
Payer: COMMERCIAL

## 2021-07-29 DIAGNOSIS — Z11.9 SCREENING EXAMINATION FOR UNSPECIFIED INFECTIOUS DISEASE: Primary | ICD-10-CM

## 2021-07-29 LAB
CTP QC/QA: YES
SARS-COV-2 RDRP RESP QL NAA+PROBE: NEGATIVE

## 2021-07-29 PROCEDURE — 99211 OFF/OP EST MAY X REQ PHY/QHP: CPT | Mod: S$GLB,,, | Performed by: FAMILY MEDICINE

## 2021-07-29 PROCEDURE — U0002 COVID-19 LAB TEST NON-CDC: HCPCS | Mod: QW,S$GLB,, | Performed by: FAMILY MEDICINE

## 2021-07-29 PROCEDURE — U0002: ICD-10-PCS | Mod: QW,S$GLB,, | Performed by: FAMILY MEDICINE

## 2021-07-29 PROCEDURE — 99211 PR OFFICE/OUTPT VISIT, EST, LEVL I: ICD-10-PCS | Mod: S$GLB,,, | Performed by: FAMILY MEDICINE
